# Patient Record
Sex: FEMALE | Race: WHITE
[De-identification: names, ages, dates, MRNs, and addresses within clinical notes are randomized per-mention and may not be internally consistent; named-entity substitution may affect disease eponyms.]

---

## 2019-08-05 ENCOUNTER — HOSPITAL ENCOUNTER (OUTPATIENT)
Dept: HOSPITAL 10 - OBT | Age: 29
Discharge: HOME | End: 2019-08-05
Attending: OBSTETRICS & GYNECOLOGY
Payer: COMMERCIAL

## 2019-08-05 ENCOUNTER — HOSPITAL ENCOUNTER (OUTPATIENT)
Dept: HOSPITAL 91 - OBT | Age: 29
Discharge: HOME | End: 2019-08-05
Payer: COMMERCIAL

## 2019-08-05 VITALS
BODY MASS INDEX: 31.72 KG/M2 | BODY MASS INDEX: 31.72 KG/M2 | WEIGHT: 190.39 LBS | HEIGHT: 65 IN | WEIGHT: 190.39 LBS | HEIGHT: 65 IN

## 2019-08-05 DIAGNOSIS — O36.63X0: Primary | ICD-10-CM

## 2019-08-05 DIAGNOSIS — Z3A.37: ICD-10-CM

## 2019-08-05 PROCEDURE — 76815 OB US LIMITED FETUS(S): CPT

## 2019-08-05 PROCEDURE — G0463 HOSPITAL OUTPT CLINIC VISIT: HCPCS

## 2019-08-05 NOTE — PN
Triage Information


Date/Time





Reason for visit:  


Weeks of Gestation


37.6


/Para


3/2


Assessment/Plan


presents for efw for suspected macrosomia


Estimated fetal weight: 3346 g plus or minus 502 g. The EFW falls is 63%.


no complaints


discharge to home w f/u with primary ob











MILESTONE,CHACORTA RAMOS              Aug 5, 2019 13:24

## 2019-08-05 NOTE — TRIAGE
===================================

OB Triage

===================================

Datetime Report Generated by CPN: 08/05/2019 13:32

   

   

===========================

Datetime: 08/05/2019 12:35

===========================

   

 Stage of Pregnancy:  OB Triage

   

===================================

Maternal Assessment

===================================

   

 Level of Consciousness:  Keenly Alert, Responsive

 DTR's/Clonus:  DTRs 1+

 Headache:  Denies

 Breath Sounds, Left:  Clear and Equal

 Breath Sounds, Right:  Clear and Equal

 Nausea/Vomiting:  Denies

 RUQ Epigastric Pain:  Denies

   

===================================

Labor Evaluation

===================================

   

 Frequency:  NONE

 Monitor Mode:  External

 Resting Tone Carmen:  Relaxed

   

===================================

Fetal Heart Rate

===================================

   

 FHR Baseline Rate:  115

 Monitor Mode:  External US

 Variability:  Moderate 6-25 bpm

 Accelerations:  15X15

 Decelerations:  None

 Category:  Category I

   

===================================

Pain Assessment

===================================

   

 Pain Scale:  0

 Pain Presence:  None/Denies

 Pain Type:  N/A

 Pain Goal:  3

   

===================================

Vaginal Exam

===================================

   

 Membrane Status:  Intact

   

===========================

Datetime: 08/05/2019 12:02

===========================

   

   

===================================

Maternal Assessment

===================================

   

 Level of Consciousness:  Keenly Alert, Responsive

 DTR's/Clonus:  DTRs 1+

 Headache:  Denies

 Blurred Vision:  No

 Respiratory Effort:  Unlabored

 Breath Sounds, Left:  Clear and Equal

 Breath Sounds, Right:  Clear and Equal

 Nausea/Vomiting:  Denies

 RUQ Epigastric Pain:  Denies

 Facial Edema:  None

   

===================================

Labor Evaluation

===================================

   

 Frequency:  NONE

 Monitor Mode:  External

 Resting Tone Carmen:  Relaxed

   

===================================

Fetal Heart Rate

===================================

   

 FHR Baseline Rate:  115

 Monitor Mode:  External US

 Variability:  Moderate 6-25 bpm

 Accelerations:  15X15

 Decelerations:  None

 Category:  Category I

   

===================================

Pain Assessment

===================================

   

 Pain Scale:  0

 Pain Presence:  None/Denies

 Pain Type:  N/A

 Pain Goal:  3

   

===================================

Vaginal Exam

===================================

   

 Membrane Status:  Intact

   

===========================

Datetime: 08/05/2019 11:49

===========================

   

 Stage of Pregnancy:  OB Triage

   

===========================

Datetime: 08/05/2019 11:35

===========================

   

 Time of Arrival:  08/05/2019 11:35

 EGA:  37.6

 Chief Complaint:  PT CAME IN FROM CLINIC TO R/O MACROSOMIA

 Additional Patient Complaints:  NONE

 Time Provider Notified:  08/05/2019 11:54

 Provider Notified:  MUSA

 Initial Plan:  MONITOR, EFW